# Patient Record
Sex: MALE
[De-identification: names, ages, dates, MRNs, and addresses within clinical notes are randomized per-mention and may not be internally consistent; named-entity substitution may affect disease eponyms.]

---

## 2020-08-24 ENCOUNTER — TRANSCRIPTION ENCOUNTER (OUTPATIENT)
Age: 55
End: 2020-08-24

## 2020-08-24 ENCOUNTER — APPOINTMENT (OUTPATIENT)
Dept: OTOLARYNGOLOGY | Facility: CLINIC | Age: 55
End: 2020-08-24
Payer: COMMERCIAL

## 2020-08-24 VITALS — HEIGHT: 67.72 IN | WEIGHT: 167.55 LBS | BODY MASS INDEX: 25.69 KG/M2 | TEMPERATURE: 97.2 F

## 2020-08-24 DIAGNOSIS — Z78.9 OTHER SPECIFIED HEALTH STATUS: ICD-10-CM

## 2020-08-24 PROBLEM — Z00.00 ENCOUNTER FOR PREVENTIVE HEALTH EXAMINATION: Status: ACTIVE | Noted: 2020-08-24

## 2020-08-24 PROCEDURE — 92550 TYMPANOMETRY & REFLEX THRESH: CPT

## 2020-08-24 PROCEDURE — 99203 OFFICE O/P NEW LOW 30 MIN: CPT | Mod: 25

## 2020-08-24 PROCEDURE — 92557 COMPREHENSIVE HEARING TEST: CPT

## 2020-08-24 RX ORDER — NEOMYCIN SULFATE, POLYMYXIN B SULFATE AND HYDROCORTISONE 3.5; 10000; 1 MG/ML; [IU]/ML; MG/ML
3.5-10000-1 SOLUTION AURICULAR (OTIC) 4 TIMES DAILY
Qty: 1 | Refills: 3 | Status: ACTIVE | COMMUNITY
Start: 2020-08-24 | End: 1900-01-01

## 2020-08-24 NOTE — DATA REVIEWED
[de-identified] : Complete audiometry was ordered and completed today. This was separately reported by the audiologist. The results were reviewed in detail with the patient.\par \par

## 2020-08-24 NOTE — HISTORY OF PRESENT ILLNESS
[de-identified] : CISCO DELUNA has a history of hearing loss in the right ear noticed a few weeks ago.  No ear pain reported. Possibly related to water sports at the beach a few weeks ago.  Frequent ear infections as a child.  No prior procedures in the ear.

## 2020-08-24 NOTE — PHYSICAL EXAM
[FreeTextEntry1] : Microscopic ear exam with cerumen debridement:\par \par Right ear: Obstructing cerumen was debrided from the ear canal using suction, and curet.  Exostosis causing significant narrowing of the inferior canal wall. Acute inflammation is present. The tympanic membrane appears to be intact and noninflamed. No effusion identified. \par \par Left ear: The ear canal was patent and nonobstructed. Posterior tympanic membrane retraction with adherence to the ossicular chain. No visible erosion or inflammation. No effusion.\par \par [place] [facility] Assessment\par 512 Hz:\par n > Bone conduction [Midline] : trachea located in midline position [Normal] : no rashes

## 2020-08-24 NOTE — CONSULT LETTER
[FreeTextEntry2] : Dear Dr Miles Banda [Please see my note below.] : Please see my note below. [FreeTextEntry1] : Thank you for allowing me to participate in the care of CISCO DELUNA .\par Please see the attached visit note.\par \par \par \par Yeyo Preston\par Otology\par Department of Otolaryngology\par [facility]

## 2020-09-21 ENCOUNTER — APPOINTMENT (OUTPATIENT)
Dept: OTOLARYNGOLOGY | Facility: CLINIC | Age: 55
End: 2020-09-21
Payer: COMMERCIAL

## 2020-09-21 VITALS — HEIGHT: 67.72 IN | BODY MASS INDEX: 25.61 KG/M2 | WEIGHT: 167 LBS | TEMPERATURE: 95 F

## 2020-09-21 DIAGNOSIS — H90.A11 CONDUCTIVE HEARING LOSS, UNILATERAL, RIGHT EAR WITH RESTRICTED HEARING ON THE CONTRALATERAL SIDE: ICD-10-CM

## 2020-09-21 DIAGNOSIS — H93.299 OTHER ABNORMAL AUDITORY PERCEPTIONS, UNSPECIFIED EAR: ICD-10-CM

## 2020-09-21 PROCEDURE — 92504 EAR MICROSCOPY EXAMINATION: CPT

## 2020-09-21 PROCEDURE — 99213 OFFICE O/P EST LOW 20 MIN: CPT | Mod: 25

## 2020-09-21 NOTE — HISTORY OF PRESENT ILLNESS
[de-identified] : CISCO DELUNA has a history of hearing loss in the right ear with exostosis.  Possibly related to water sports at the beach a few weeks ago.  Frequent ear infections as a child.  No prior procedures in the ear.  [FreeTextEntry1] : 09/21/2020 \par better hearing reported.  No ear pain or otorrhea.  compliant with meds.  New onset of mild Tinnitus is noted in the right ear.

## 2020-09-21 NOTE — ASSESSMENT
[FreeTextEntry1] : The patient has mild to moderate conductive hearing loss in the right ear which is not causing significant interference with daily life of current moment. We discussed this in detail. We discussed management strategies. He wished a watchful waiting stands at this time. I have reviewed ear hygiene. I have also recommended followup in 6 months. With repeat audiometry

## 2020-09-21 NOTE — DATA REVIEWED
[de-identified] : Complete audiometry was ordered and completed today. This was separately reported by the audiologist. The results were reviewed in detail with the patient.\par \par

## 2020-09-21 NOTE — CONSULT LETTER
[Please see my note below.] : Please see my note below. [FreeTextEntry2] : Dear LORI PARIS  [FreeTextEntry1] : Thank you for allowing me to participate in the care of CISCO DELUNA .\par Please see the attached visit note.\par \par \par \par Yeyo Preston\par Otology\par Department of Otolaryngology\par [facility]

## 2020-09-21 NOTE — PHYSICAL EXAM
[Normal] : temporomandibular joint is normal [FreeTextEntry1] : Microscopic ear exam with cerumen debridement:\par \par Right ear: Obstructing cerumen was debrided from the ear canal using curet.  Exostosis causing significant narrowing of the inferior canal wall. NO inflammation is present. The tympanic membrane appears to be intact and noninflamed. No effusion identified. \par \par Left ear: The ear canal was patent and nonobstructed. Posterior tympanic membrane retraction with adherence to the ossicular chain. No visible erosion or inflammation. No effusion.\par \par [place] Hearing Assessment\par 512 Hz:\par Garcia test: referred to the right ear\par Rinne test:\par 	Right Ear: Air Conduction < Bone Conduction\par 	Left Ear:   Air Conduction < Bone conduction

## 2021-03-21 ENCOUNTER — RESULT CHARGE (OUTPATIENT)
Age: 56
End: 2021-03-21

## 2021-03-22 ENCOUNTER — APPOINTMENT (OUTPATIENT)
Dept: OTOLARYNGOLOGY | Facility: CLINIC | Age: 56
End: 2021-03-22
Payer: COMMERCIAL

## 2021-03-22 VITALS — HEIGHT: 67.72 IN | WEIGHT: 167.55 LBS | TEMPERATURE: 97.2 F | BODY MASS INDEX: 25.69 KG/M2

## 2021-03-22 DIAGNOSIS — H61.21 IMPACTED CERUMEN, RIGHT EAR: ICD-10-CM

## 2021-03-22 DIAGNOSIS — H60.311 DIFFUSE OTITIS EXTERNA, RIGHT EAR: ICD-10-CM

## 2021-03-22 DIAGNOSIS — H74.12 ADHESIVE LEFT MIDDLE EAR DISEASE: ICD-10-CM

## 2021-03-22 PROCEDURE — 92557 COMPREHENSIVE HEARING TEST: CPT

## 2021-03-22 PROCEDURE — 92567 TYMPANOMETRY: CPT

## 2021-03-22 PROCEDURE — 99072 ADDL SUPL MATRL&STAF TM PHE: CPT

## 2021-03-22 PROCEDURE — 99213 OFFICE O/P EST LOW 20 MIN: CPT | Mod: 25

## 2021-03-22 NOTE — PHYSICAL EXAM
[Normal] : temporomandibular joint is normal [FreeTextEntry1] : Microscopic ear exam with cerumen debridement:\par \par Right ear: Obstructing cerumen was debrided from the ear canal using curet.  Exostosis causing partial narrowing of the inferior canal wall. No inflammatin. The tympanic membrane appears to be intact and noninflamed. No effusion identified. \par \par Left ear: The ear canal was patent and nonobstructed. Posterior tympanic membrane retraction with adherence to the ossicular chain. No visible erosion or inflammation. No effusion.\par

## 2021-03-22 NOTE — DATA REVIEWED
[de-identified] : Complete audiometry was ordered and completed today. I have interpreted these results and reviewed them in detail with the patient.\par \par

## 2021-03-22 NOTE — HISTORY OF PRESENT ILLNESS
[de-identified] : CISCO DELUNA has a history of hearing loss in the right ear with exostosis.  Possibly related to water sports at the beach a few weeks ago.  Frequent ear infections as a child.  No prior procedures in the ear.  [FreeTextEntry1] : 03/22/2021 \par Patient reports of tinnitus in both ears, more in the right than the left. No Pain and no otorrhea.  Uncertain change in hearing reported.

## 2021-03-22 NOTE — ASSESSMENT
[FreeTextEntry1] : Stable mild to moderate hearing loss noted bilaterally with air-bone gaps. Partial stenosis of the right ear canal. This was reviewed with the patient. Continued clinical monitoring advised.\par \par Ear hygiene reviewed.\par \par I have carefully reviewed the etiologies of tinnitus with the patient and have reviewed management options including coping strategies.  I have offered a referral to an audiologist for further evaluation and counseling.

## 2021-09-20 ENCOUNTER — APPOINTMENT (OUTPATIENT)
Dept: OTOLARYNGOLOGY | Facility: CLINIC | Age: 56
End: 2021-09-20
Payer: COMMERCIAL

## 2021-09-20 VITALS — HEIGHT: 67.72 IN | WEIGHT: 169.75 LBS | BODY MASS INDEX: 26.03 KG/M2 | TEMPERATURE: 97.3 F

## 2021-09-20 DIAGNOSIS — H93.13 TINNITUS, BILATERAL: ICD-10-CM

## 2021-09-20 DIAGNOSIS — H90.6 MIXED CONDUCTIVE AND SENSORINEURAL HEARING LOSS, BILATERAL: ICD-10-CM

## 2021-09-20 PROCEDURE — 92504 EAR MICROSCOPY EXAMINATION: CPT

## 2021-09-20 PROCEDURE — 92557 COMPREHENSIVE HEARING TEST: CPT

## 2021-09-20 PROCEDURE — 92567 TYMPANOMETRY: CPT

## 2021-09-20 PROCEDURE — 99213 OFFICE O/P EST LOW 20 MIN: CPT | Mod: 25

## 2021-09-20 RX ORDER — ROSUVASTATIN CALCIUM 10 MG/1
10 TABLET, FILM COATED ORAL
Refills: 0 | Status: ACTIVE | COMMUNITY

## 2021-09-20 NOTE — HISTORY OF PRESENT ILLNESS
[de-identified] : CISCO DELUNA has a history of hearing loss in the right ear with exostosis.  Possibly related to water sports at the beach a few weeks ago.  Frequent ear infections as a child.  No prior procedures in the ear.  [FreeTextEntry1] : 09/20/2021 \par Patient reports of tinnitus in both ears, more in the right than the left. No Pain and no otorrhea.  Uncertain change in hearing reported.  Not intrusive.  No noise exposure. No imbalance or vertig.

## 2021-09-20 NOTE — CONSULT LETTER
[Please see my note below.] : Please see my note below. [FreeTextEntry2] : Dear LORI PARIS  [FreeTextEntry1] : Thank you for allowing me to participate in the care of CISCO DELUNA .\par Please see the attached visit note.\par \par \par \par Yeyo Preston\par Otology\par Medical Director of [facility]\par Department of Otolaryngology\par [facility]

## 2021-09-20 NOTE — ASSESSMENT
[FreeTextEntry1] : I have carefully reviewed the etiologies of tinnitus with the patient and have reviewed management options including coping strategies.  I have offered a referral to an audiologist for further evaluation and counseling.\par \par Stable appearing, exostosis right ear and tympanic membrane retraction. The left ear with hearing loss. Management options reviewed. Clinical monitoring advised.

## 2021-09-20 NOTE — PHYSICAL EXAM
[Normal] : temporomandibular joint is normal [FreeTextEntry1] : Procedure: Microscopic Ear Exam\par \par Left ear:  \par Slightly narrow ear canal. Posterior tympanic membrane retraction with ossicular contact, suspect erosion\par \par \par Right ear:  \par Significant exostosis causing canal narrowing, 60-70%. No inflammation. Tympanic membrane appears to be within normal limits.\par

## 2021-09-20 NOTE — DATA REVIEWED
[de-identified] : In order to investigate current symptoms, Complete audiometry was ordered and completed today. I have interpreted these results and reviewed them in detail with the patient.\par \par stable mixed hearing loss bilaterally

## 2021-09-21 PROBLEM — H93.13 TINNITUS OF BOTH EARS: Status: ACTIVE | Noted: 2021-09-21
